# Patient Record
(demographics unavailable — no encounter records)

---

## 2025-06-27 NOTE — HISTORY OF PRESENT ILLNESS
[(# ___ in the past year)] : [unfilled] visits to the emergency room in the past year [None] : The patient is currently asymptomatic [0 x/month] : 0 x/month [Minor Limitation] : minor limitation [< or = 2 days/wk] : < than or = 2 days/week [de-identified] : Fiona Ann is an 11-year-old female who was brought in by her mother, a former patient of Dr. Schmidt, who had previously received treatment for asthma. Last year, while attending [facility], she experienced significant difficulty in breathing, requiring her to pause frequently to take deep breaths and drink water to alleviate her discomfort. She reported that on one occasion, she was unable to sleep throughout the night due to her breathing issues. During her time at [facility], she was administered Tylenol and consulted with a nurse or doctor, who advised her to return to her activities, assuring her that she was fine. After her mother picked her up from [facility] in [place], they visited her primary care physician, who detected wheezing and subsequently referred her to Dr. Schmidt. Upon evaluation, she was placed on a nebulizer and returned to [facility] equipped with a nebulizer and an albuterol inhaler, which she utilized as necessary. She denies using the inhaler during the winter months. Dr. Schmidt conducted blood tests for allergies, which returned negative results.

## 2025-06-27 NOTE — REASON FOR VISIT
[Evaluation/Consultation] : an evaluation/consultation of [Shortness of Breath] : shortness of breath [Mother] : mother [Asthma] : asthma

## 2025-06-27 NOTE — IMPRESSION
[Pulmonary Function Test] : Pulmonary Function Test [Fractional of Exhaled Nitric Oxide ___] : Fractional of Exhaled Nitric Oxide [unfilled] [Normal Spirometry] : spirometry normal [Normal] : normal [FreeTextEntry1] : FEV1 IS 91% OF PRED

## 2025-06-27 NOTE — HISTORY OF PRESENT ILLNESS
[(# ___ in the past year)] : [unfilled] visits to the emergency room in the past year [None] : The patient is currently asymptomatic [0 x/month] : 0 x/month [Minor Limitation] : minor limitation [< or = 2 days/wk] : < than or = 2 days/week [de-identified] : Fiona Ann is an 11-year-old female who was brought in by her mother, a former patient of Dr. Schmidt, who had previously received treatment for asthma. Last year, while attending [facility], she experienced significant difficulty in breathing, requiring her to pause frequently to take deep breaths and drink water to alleviate her discomfort. She reported that on one occasion, she was unable to sleep throughout the night due to her breathing issues. During her time at [facility], she was administered Tylenol and consulted with a nurse or doctor, who advised her to return to her activities, assuring her that she was fine. After her mother picked her up from [facility] in [place], they visited her primary care physician, who detected wheezing and subsequently referred her to Dr. Schmidt. Upon evaluation, she was placed on a nebulizer and returned to [facility] equipped with a nebulizer and an albuterol inhaler, which she utilized as necessary. She denies using the inhaler during the winter months. Dr. Schmidt conducted blood tests for allergies, which returned negative results.

## 2025-06-27 NOTE — ASSESSMENT
[FreeTextEntry1] : Albuterol as needed. Will monitor her asthma and if necessary will place her on controller therapy